# Patient Record
Sex: FEMALE | Race: WHITE | NOT HISPANIC OR LATINO | Employment: FULL TIME | URBAN - METROPOLITAN AREA
[De-identification: names, ages, dates, MRNs, and addresses within clinical notes are randomized per-mention and may not be internally consistent; named-entity substitution may affect disease eponyms.]

---

## 2022-04-10 ENCOUNTER — HISTORICAL (OUTPATIENT)
Dept: ADMINISTRATIVE | Facility: HOSPITAL | Age: 57
End: 2022-04-10

## 2022-04-26 VITALS
SYSTOLIC BLOOD PRESSURE: 106 MMHG | DIASTOLIC BLOOD PRESSURE: 68 MMHG | BODY MASS INDEX: 22.89 KG/M2 | HEIGHT: 61 IN | WEIGHT: 121.25 LBS

## 2023-06-13 ENCOUNTER — OFFICE VISIT (OUTPATIENT)
Dept: URGENT CARE | Facility: CLINIC | Age: 58
End: 2023-06-13
Payer: COMMERCIAL

## 2023-06-13 VITALS
WEIGHT: 125 LBS | DIASTOLIC BLOOD PRESSURE: 74 MMHG | BODY MASS INDEX: 23.6 KG/M2 | HEIGHT: 61 IN | HEART RATE: 74 BPM | SYSTOLIC BLOOD PRESSURE: 132 MMHG | TEMPERATURE: 99 F | RESPIRATION RATE: 20 BRPM | OXYGEN SATURATION: 97 %

## 2023-06-13 DIAGNOSIS — R05.9 COUGH, UNSPECIFIED TYPE: ICD-10-CM

## 2023-06-13 DIAGNOSIS — J32.9 SINUSITIS, UNSPECIFIED CHRONICITY, UNSPECIFIED LOCATION: Primary | ICD-10-CM

## 2023-06-13 LAB
CTP QC/QA: YES
CTP QC/QA: YES
POC MOLECULAR INFLUENZA A AGN: NEGATIVE
POC MOLECULAR INFLUENZA B AGN: NEGATIVE
SARS-COV-2 RDRP RESP QL NAA+PROBE: NEGATIVE

## 2023-06-13 PROCEDURE — 96372 THER/PROPH/DIAG INJ SC/IM: CPT | Mod: ,,,

## 2023-06-13 PROCEDURE — 87635: ICD-10-PCS | Mod: QW,,,

## 2023-06-13 PROCEDURE — 99203 OFFICE O/P NEW LOW 30 MIN: CPT | Mod: 25,,,

## 2023-06-13 PROCEDURE — 87502 POCT INFLUENZA A/B MOLECULAR: ICD-10-PCS | Mod: QW,,,

## 2023-06-13 PROCEDURE — 87635 SARS-COV-2 COVID-19 AMP PRB: CPT | Mod: QW,,,

## 2023-06-13 PROCEDURE — 99203 PR OFFICE/OUTPT VISIT, NEW, LEVL III, 30-44 MIN: ICD-10-PCS | Mod: 25,,,

## 2023-06-13 PROCEDURE — 96372 PR INJECTION,THERAP/PROPH/DIAG2ST, IM OR SUBCUT: ICD-10-PCS | Mod: ,,,

## 2023-06-13 PROCEDURE — 87502 INFLUENZA DNA AMP PROBE: CPT | Mod: QW,,,

## 2023-06-13 RX ORDER — DEXAMETHASONE SODIUM PHOSPHATE 100 MG/10ML
10 INJECTION INTRAMUSCULAR; INTRAVENOUS ONCE
Status: COMPLETED | OUTPATIENT
Start: 2023-06-13 | End: 2023-06-13

## 2023-06-13 RX ORDER — DICLOFENAC POTASSIUM 50 MG/1
50 TABLET, FILM COATED ORAL 2 TIMES DAILY
COMMUNITY
Start: 2023-02-22

## 2023-06-13 RX ORDER — DOXYCYCLINE 100 MG/1
100 CAPSULE ORAL 2 TIMES DAILY
Qty: 14 CAPSULE | Refills: 0 | Status: SHIPPED | OUTPATIENT
Start: 2023-06-13 | End: 2023-06-20

## 2023-06-13 RX ORDER — PROMETHAZINE HYDROCHLORIDE AND DEXTROMETHORPHAN HYDROBROMIDE 6.25; 15 MG/5ML; MG/5ML
5 SYRUP ORAL EVERY 6 HOURS PRN
Qty: 180 ML | Refills: 0 | Status: SHIPPED | OUTPATIENT
Start: 2023-06-13 | End: 2023-06-23

## 2023-06-13 RX ORDER — NORETHINDRONE ACETATE AND ETHINYL ESTRADIOL 1; 5 MG/1; UG/1
1 TABLET ORAL
COMMUNITY
Start: 2023-05-31

## 2023-06-13 RX ORDER — PREDNISONE 20 MG/1
20 TABLET ORAL DAILY
Qty: 5 TABLET | Refills: 0 | Status: SHIPPED | OUTPATIENT
Start: 2023-06-13 | End: 2023-06-18

## 2023-06-13 RX ORDER — ESCITALOPRAM OXALATE 10 MG/1
10 TABLET ORAL
COMMUNITY
Start: 2023-05-30

## 2023-06-13 RX ORDER — ESTRADIOL/NORETHINDRONE ACETATE 1; .5 MG/1; MG/1
1 TABLET, FILM COATED ORAL
COMMUNITY
Start: 2023-05-02

## 2023-06-13 RX ADMIN — DEXAMETHASONE SODIUM PHOSPHATE 10 MG: 100 INJECTION INTRAMUSCULAR; INTRAVENOUS at 01:06

## 2023-06-13 NOTE — PROGRESS NOTES
"Subjective:      Patient ID: Nuria Willis is a 57 y.o. female.    Vitals:  height is 5' 1" (1.549 m) and weight is 56.7 kg (125 lb). Her temperature is 98.5 °F (36.9 °C). Her blood pressure is 132/74 and her pulse is 74. Her respiration is 20 and oxygen saturation is 97%.     Chief Complaint: Sinus Problem (Pt symptoms started 3 weeks with sinus congestion, sinus headache, burning eyes, and a cough.  )    Patient is a 57-year-old female that presents complaining of sinus congestion, sinus pressure headache, burning eyes and cough progressively worsening for the past 3 weeks and not getting better with over-the-counter medications Patient denies any SOB, CP, rash, n/v/d, or neck stiffness.      Cough      HENT:  Positive for congestion.    Respiratory:  Positive for cough.     Objective:     Physical Exam   Constitutional: She is oriented to person, place, and time.  Non-toxic appearance. She does not appear ill.   HENT:   Ears:   Right Ear: Tympanic membrane, external ear and ear canal normal.   Left Ear: Tympanic membrane, external ear and ear canal normal.   Nose: Rhinorrhea present. Right sinus exhibits maxillary sinus tenderness. Left sinus exhibits maxillary sinus tenderness.   Mouth/Throat: Posterior oropharyngeal erythema (clear PND noted) present. No tonsillar exudate.   Pulmonary/Chest: Effort normal and breath sounds normal.   Abdominal: Normal appearance and bowel sounds are normal. Soft. There is no abdominal tenderness.   Musculoskeletal: Normal range of motion.         General: Normal range of motion.   Neurological: She is alert and oriented to person, place, and time.   Skin: Skin is warm and dry. Capillary refill takes less than 2 seconds.   Psychiatric: Her behavior is normal. Mood normal.     Assessment:     1. Sinusitis, unspecified chronicity, unspecified location    2. Cough, unspecified type           Previous History      Review of patient's allergies indicates:   Allergen Reactions    " "Demerol [meperidine]     Erythromycin      Other reaction(s): UNKNOWN    Penicillin      Other reaction(s): UNKNOWN    Sulfamethoxazole      Other reaction(s): UNKNOWN       Past Medical History:   Diagnosis Date    Depression      Current Outpatient Medications   Medication Instructions    ACTIVELLA 1-0.5 mg per tablet 1 tablet, Oral    diclofenac (CATAFLAM) 50 mg, Oral, 2 times daily    doxycycline (VIBRAMYCIN) 100 mg, Oral, 2 times daily    EScitalopram oxalate (LEXAPRO) 10 mg, Oral    norethindrone-ethinyl estradiol (FEMHRT 1/5) 1-5 mg-mcg Tab 1 tablet, Oral    predniSONE (DELTASONE) 20 mg, Oral, Daily    promethazine-dextromethorphan (PROMETHAZINE-DM) 6.25-15 mg/5 mL Syrp 5 mLs, Oral, Every 6 hours PRN     Past Surgical History:   Procedure Laterality Date    ADENOIDECTOMY      ELBOW SURGERY      TONSILLECTOMY       Family History   Problem Relation Age of Onset    Heart failure Mother     Kidney failure Mother     Arthritis Father     No Known Problems Sister     No Known Problems Brother        Social History     Tobacco Use    Smoking status: Some Days     Packs/day: 0.25     Types: Cigarettes    Smokeless tobacco: Never   Substance Use Topics    Alcohol use: Yes    Drug use: Not Currently        Physical Exam      Vital Signs Reviewed   /74   Pulse 74   Temp 98.5 °F (36.9 °C)   Resp 20   Ht 5' 1" (1.549 m)   Wt 56.7 kg (125 lb)   SpO2 97%   BMI 23.62 kg/m²        Procedures    Procedures     Labs     Results for orders placed or performed in visit on 06/13/23   POCT COVID-19 Rapid Screening   Result Value Ref Range    POC Rapid COVID Negative Negative     Acceptable Yes    POCT Influenza A/B MOLECULAR   Result Value Ref Range    POC Molecular Influenza A Ag Negative Negative, Not Reported    POC Molecular Influenza B Ag Negative Negative, Not Reported     Acceptable Yes       Plan:       Sinusitis, unspecified chronicity, unspecified location  -     doxycycline " (VIBRAMYCIN) 100 MG Cap; Take 1 capsule (100 mg total) by mouth 2 (two) times daily. for 7 days  Dispense: 14 capsule; Refill: 0  -     dexAMETHasone injection 10 mg    Cough, unspecified type  -     POCT COVID-19 Rapid Screening  -     POCT Influenza A/B MOLECULAR  -     predniSONE (DELTASONE) 20 MG tablet; Take 1 tablet (20 mg total) by mouth once daily. for 5 days  Dispense: 5 tablet; Refill: 0  -     promethazine-dextromethorphan (PROMETHAZINE-DM) 6.25-15 mg/5 mL Syrp; Take 5 mLs by mouth every 6 (six) hours as needed (cough).  Dispense: 180 mL; Refill: 0        Take OTC cough/cold/congestion medication such as Dayquil/Nyquil.  May also take antihistamine such as Claritin/Zyrtec/Allegra.  Cepacol sore throat lozenges if needed.     Drink plenty of fluids.  Rest.      Prednisone- to help with congestion/inflammation- take as prescribed- take with food.  Start to take tomorrow being you were given a shot in clinic today.    Take antibiotic until completely gone. Take with food to avoid upset stomach.

## 2023-06-13 NOTE — LETTER
June 13, 2023      [facility] [facility]  [address]  [place] [address]  Phone: [phone]  Fax: [phone]       Patient: Nuria Willis   YOB: 1965  Date of Visit: 06/13/2023    To Whom It May Concern:    Linda Willis  was at [facility] on 06/13/2023. The patient may return to work/school on 06/15/2023 with no restrictions. If you have any questions or concerns, or if I can be of further assistance, please do not hesitate to contact me.    Sincerely,    Eusebia Moore RT

## 2023-06-13 NOTE — PATIENT INSTRUCTIONS
Take OTC cough/cold/congestion medication such as Dayquil/Nyquil.  May also take antihistamine such as Claritin/Zyrtec/Allegra.  Cepacol sore throat lozenges if needed.     Drink plenty of fluids.  Rest.      Prednisone- to help with congestion/inflammation- take as prescribed- take with food.  Start to take tomorrow being you were given a shot in clinic today.    Take antibiotic until completely gone. Take with food to avoid upset stomach.

## 2024-02-01 ENCOUNTER — OFFICE VISIT (OUTPATIENT)
Dept: URGENT CARE | Facility: CLINIC | Age: 59
End: 2024-02-01
Payer: COMMERCIAL

## 2024-02-01 VITALS
HEART RATE: 71 BPM | TEMPERATURE: 98 F | SYSTOLIC BLOOD PRESSURE: 116 MMHG | WEIGHT: 125 LBS | RESPIRATION RATE: 18 BRPM | DIASTOLIC BLOOD PRESSURE: 73 MMHG | OXYGEN SATURATION: 100 % | BODY MASS INDEX: 23.6 KG/M2 | HEIGHT: 61 IN

## 2024-02-01 DIAGNOSIS — R05.9 COUGH, UNSPECIFIED TYPE: ICD-10-CM

## 2024-02-01 DIAGNOSIS — J40 BRONCHITIS: ICD-10-CM

## 2024-02-01 DIAGNOSIS — H92.02 OTALGIA OF LEFT EAR: Primary | ICD-10-CM

## 2024-02-01 PROCEDURE — 87635 SARS-COV-2 COVID-19 AMP PRB: CPT | Mod: QW,,,

## 2024-02-01 PROCEDURE — 99213 OFFICE O/P EST LOW 20 MIN: CPT | Mod: 25,,,

## 2024-02-01 PROCEDURE — 96372 THER/PROPH/DIAG INJ SC/IM: CPT | Mod: ,,,

## 2024-02-01 PROCEDURE — 87502 INFLUENZA DNA AMP PROBE: CPT | Mod: QW,,,

## 2024-02-01 RX ORDER — DOXYCYCLINE 100 MG/1
100 CAPSULE ORAL 2 TIMES DAILY
Qty: 14 CAPSULE | Refills: 0 | Status: SHIPPED | OUTPATIENT
Start: 2024-02-01 | End: 2024-02-08

## 2024-02-01 RX ORDER — BETAMETHASONE SODIUM PHOSPHATE AND BETAMETHASONE ACETATE 3; 3 MG/ML; MG/ML
9 INJECTION, SUSPENSION INTRA-ARTICULAR; INTRALESIONAL; INTRAMUSCULAR; SOFT TISSUE
Status: COMPLETED | OUTPATIENT
Start: 2024-02-01 | End: 2024-02-01

## 2024-02-01 RX ADMIN — BETAMETHASONE SODIUM PHOSPHATE AND BETAMETHASONE ACETATE 9 MG: 3; 3 INJECTION, SUSPENSION INTRA-ARTICULAR; INTRALESIONAL; INTRAMUSCULAR; SOFT TISSUE at 11:02

## 2024-02-01 NOTE — PROGRESS NOTES
"Subjective:      Patient ID: Nuria Willis is a 58 y.o. female.    Vitals:  height is 5' 1" (1.549 m) and weight is 56.7 kg (125 lb). Her temperature is 98.2 °F (36.8 °C). Her blood pressure is 116/73 and her pulse is 71. Her respiration is 18 and oxygen saturation is 100%.     Chief Complaint: Cough (Pt presents to clinic with cough( 2 weeks), and ear pain in her left ear. Symptomatic x 2 days)    Patient is a 58-year-old female that presents complaining of cough for the past 2 weeks and then began to have left ear pain for the past 2 days with mild congestion.  States cough is especially bad at night. Patient would like flu and COVID testing "because everyone has been sick at work". Patient denies any SOB, CP, rash, n/v/d, or neck stiffness.      Cough  Associated symptoms include ear pain.       HENT:  Positive for ear pain.    Respiratory:  Positive for cough.       Objective:     Physical Exam   Constitutional: She is oriented to person, place, and time.  Non-toxic appearance. She does not appear ill.   HENT:   Ears:   Right Ear: Tympanic membrane, external ear and ear canal normal.   Left Ear: There is tenderness. A middle ear effusion is present.   Nose: Congestion present.   Mouth/Throat: Mucous membranes are moist. No posterior oropharyngeal erythema. Oropharynx is clear.   Eyes: Conjunctivae are normal.   Cardiovascular: Normal rate and normal pulses.   Pulmonary/Chest: Effort normal and breath sounds normal.   Abdominal: Normal appearance and bowel sounds are normal. Soft. There is no abdominal tenderness.   Musculoskeletal: Normal range of motion.         General: Normal range of motion.   Neurological: She is alert and oriented to person, place, and time.   Skin: Skin is warm, dry and no rash. Capillary refill takes less than 2 seconds.   Psychiatric: Her behavior is normal. Mood normal.       Assessment:     1. Otalgia of left ear    2. Cough, unspecified type    3. Bronchitis           Previous " "History      Review of patient's allergies indicates:   Allergen Reactions    Demerol [meperidine]     Erythromycin      Other reaction(s): UNKNOWN    Penicillin      Other reaction(s): UNKNOWN    Sulfamethoxazole      Other reaction(s): UNKNOWN       Past Medical History:   Diagnosis Date    Depression      Current Outpatient Medications   Medication Instructions    ACTIVELLA 1-0.5 mg per tablet 1 tablet, Oral    diclofenac (CATAFLAM) 50 mg, Oral, 2 times daily    doxycycline (VIBRAMYCIN) 100 mg, Oral, 2 times daily    EScitalopram oxalate (LEXAPRO) 10 mg, Oral    norethindrone-ethinyl estradiol (FEMHRT 1/5) 1-5 mg-mcg Tab 1 tablet, Oral     Past Surgical History:   Procedure Laterality Date    ADENOIDECTOMY      ELBOW SURGERY      TONSILLECTOMY       Family History   Problem Relation Age of Onset    Heart failure Mother     Kidney failure Mother     Arthritis Father     No Known Problems Sister     No Known Problems Brother        Social History     Tobacco Use    Smoking status: Some Days     Current packs/day: 0.25     Types: Cigarettes    Smokeless tobacco: Never   Substance Use Topics    Alcohol use: Yes    Drug use: Not Currently        Physical Exam      Vital Signs Reviewed   /73   Pulse 71   Temp 98.2 °F (36.8 °C)   Resp 18   Ht 5' 1" (1.549 m)   Wt 56.7 kg (125 lb)   SpO2 100%   BMI 23.62 kg/m²        Procedures    Procedures     Labs     Results for orders placed or performed in visit on 02/01/24   POCT COVID-19 Rapid Screening   Result Value Ref Range    POC Rapid COVID Negative Negative     Acceptable Yes    POCT Influenza A/B MOLECULAR   Result Value Ref Range    POC Molecular Influenza A Ag Negative Negative, Not Reported    POC Molecular Influenza B Ag Negative Negative, Not Reported     Acceptable Yes       Plan:       Otalgia of left ear  -     doxycycline (VIBRAMYCIN) 100 MG Cap; Take 1 capsule (100 mg total) by mouth 2 (two) times daily. for 7 days  " Dispense: 14 capsule; Refill: 0    Cough, unspecified type  -     betamethasone acetate-betamethasone sodium phosphate injection 9 mg    Bronchitis    Suspect that you have developed bronchitis as this is common after a viral illness and causes a cough that can last 2-3 weeks.     You were given a steroid in clinic today to help decrease inflammation of bronchioles.     No obvious ear infection however do note tenderness and clear fluid.   As discussed, hold antibiotic and only take if not getting better in the next few days or begins to get worse.     Follow up with primary care in 5-6 days if not better.     Go directly to emergency room if you begin to have shortness of breath, chest pain, or other worrisome symptoms.

## 2024-02-01 NOTE — PATIENT INSTRUCTIONS
Suspect that you have developed bronchitis as this is common after a viral illness and causes a cough that can last 2-3 weeks.     You were given a steroid in clinic today to help decrease inflammation of bronchioles.     No obvious ear infection however do note tenderness and clear fluid.   As discussed, hold antibiotic and only take if not getting better in the next few days or begins to get worse.     Follow up with primary care in 5-6 days if not better.     Go directly to emergency room if you begin to have shortness of breath, chest pain, or other worrisome symptoms.